# Patient Record
Sex: FEMALE | Race: BLACK OR AFRICAN AMERICAN
[De-identification: names, ages, dates, MRNs, and addresses within clinical notes are randomized per-mention and may not be internally consistent; named-entity substitution may affect disease eponyms.]

---

## 2017-02-24 ENCOUNTER — RX RENEWAL (OUTPATIENT)
Age: 82
End: 2017-02-24

## 2018-07-09 ENCOUNTER — APPOINTMENT (OUTPATIENT)
Dept: OTOLARYNGOLOGY | Facility: CLINIC | Age: 83
End: 2018-07-09
Payer: MEDICARE

## 2018-07-09 VITALS
DIASTOLIC BLOOD PRESSURE: 68 MMHG | BODY MASS INDEX: 34.36 KG/M2 | HEIGHT: 60 IN | SYSTOLIC BLOOD PRESSURE: 130 MMHG | WEIGHT: 175 LBS | HEART RATE: 57 BPM

## 2018-07-09 DIAGNOSIS — H93.13 TINNITUS, BILATERAL: ICD-10-CM

## 2018-07-09 PROCEDURE — 92557 COMPREHENSIVE HEARING TEST: CPT

## 2018-07-09 PROCEDURE — 99203 OFFICE O/P NEW LOW 30 MIN: CPT | Mod: 25

## 2018-07-09 PROCEDURE — 92588 EVOKED AUDITORY TST COMPLETE: CPT

## 2018-07-09 PROCEDURE — 92567 TYMPANOMETRY: CPT

## 2019-11-22 ENCOUNTER — APPOINTMENT (OUTPATIENT)
Dept: OTOLARYNGOLOGY | Facility: CLINIC | Age: 84
End: 2019-11-22
Payer: MEDICARE

## 2019-11-22 VITALS
HEIGHT: 60 IN | SYSTOLIC BLOOD PRESSURE: 149 MMHG | HEART RATE: 67 BPM | BODY MASS INDEX: 33.96 KG/M2 | WEIGHT: 173 LBS | DIASTOLIC BLOOD PRESSURE: 74 MMHG

## 2019-11-22 DIAGNOSIS — H93.11 TINNITUS, RIGHT EAR: ICD-10-CM

## 2019-11-22 PROCEDURE — 92567 TYMPANOMETRY: CPT

## 2019-11-22 PROCEDURE — 99214 OFFICE O/P EST MOD 30 MIN: CPT | Mod: 25

## 2019-11-22 PROCEDURE — 92557 COMPREHENSIVE HEARING TEST: CPT

## 2019-11-22 RX ORDER — MOMETASONE FUROATE 1 MG/G
0.1 OINTMENT TOPICAL
Qty: 1 | Refills: 2 | Status: ACTIVE | COMMUNITY
Start: 2019-11-22 | End: 1900-01-01

## 2019-11-22 NOTE — ASSESSMENT
[FreeTextEntry1] : ear eczema au\par rec mometasone ointment prn\par audio moderate au sn hearing loss rec hearing aid evaluation\par fu prn

## 2019-11-22 NOTE — CONSULT LETTER
[Sincerely,] : Sincerely, [FreeTextEntry1] : Dear Dr. DANNI MURRELL,\par \par Thank you for your kind referral. Please refer to my enclosed office notes for MARLENE ALLEN . If there are any questions free to contact me.\par  [FreeTextEntry3] : Derian Sesay MD, FACS\par \par

## 2020-09-16 ENCOUNTER — NON-APPOINTMENT (OUTPATIENT)
Age: 85
End: 2020-09-16

## 2020-09-16 ENCOUNTER — APPOINTMENT (OUTPATIENT)
Dept: CARDIOLOGY | Facility: CLINIC | Age: 85
End: 2020-09-16
Payer: MEDICARE

## 2020-09-16 VITALS
BODY MASS INDEX: 34.16 KG/M2 | WEIGHT: 174 LBS | HEIGHT: 60 IN | HEART RATE: 59 BPM | DIASTOLIC BLOOD PRESSURE: 71 MMHG | SYSTOLIC BLOOD PRESSURE: 137 MMHG | OXYGEN SATURATION: 97 % | TEMPERATURE: 99.1 F | RESPIRATION RATE: 14 BRPM

## 2020-09-16 DIAGNOSIS — M10.9 GOUT, UNSPECIFIED: ICD-10-CM

## 2020-09-16 DIAGNOSIS — M54.5 LOW BACK PAIN: ICD-10-CM

## 2020-09-16 PROCEDURE — 93000 ELECTROCARDIOGRAM COMPLETE: CPT

## 2020-09-16 PROCEDURE — 99204 OFFICE O/P NEW MOD 45 MIN: CPT

## 2020-09-16 NOTE — DISCUSSION/SUMMARY
[___ Month(s)] : [unfilled] month(s) [With Me] : with me [FreeTextEntry1] : \par Hypertension: Blood pressure is satisfactorily controlled - continue current antihypertensive regimen, including lisinopril, amlodipine, and Bystolic.\par \par Abnormal ECG: Mild, nonspecific T wave abnormality.  Mrs. Call has not been experiencing angina and describes a normal cardiac workup by another cardiologist approximately two-year his earlier - she is not interested in pursuing additional testing at this time.

## 2020-09-16 NOTE — PHYSICAL EXAM
[Eyelids - No Xanthelasma] : the eyelids demonstrated no xanthelasmas [FreeTextEntry1] : No JVD [Heart Rate And Rhythm] : heart rate and rhythm were normal [Heart Sounds] : normal S1 and S2 [Respiration, Rhythm And Depth] : normal respiratory rhythm and effort [Edema] : no peripheral edema present [Murmurs] : no murmurs present [Auscultation Breath Sounds / Voice Sounds] : lungs were clear to auscultation bilaterally [Bowel Sounds] : normal bowel sounds [Abdomen Soft] : soft [Abnormal Walk] : normal gait [Nail Clubbing] : no clubbing of the fingernails [Cyanosis, Localized] : no localized cyanosis [Skin Color & Pigmentation] : normal skin color and pigmentation [Oriented To Time, Place, And Person] : oriented to person, place, and time [Impaired Insight] : insight and judgment were intact

## 2020-09-16 NOTE — REASON FOR VISIT
[Initial Evaluation] : an initial evaluation of [Medication Management] : Medication management [Hypertension] : hypertension

## 2020-09-16 NOTE — HISTORY OF PRESENT ILLNESS
[FreeTextEntry1] : Urmila Call is an 87-year-old woman with a history of hypertension, prediabetes, hypothyroidism, and esophageal reflux who presents for cardiology examination after a long absence - our last encounter was in March 2016; she subsequently found a physician closer to her home because of difficulties commuting to my office.  She requests "a checkup" because a friend recently passed away from an apparent myocardial infarction in his early 50s.  She describes the absence of chest discomfort; occasionally experiences mild dyspnea that she attributes to deconditioning and obesity.  She met with her cardiologist, Dr. Carballo, in 2018 and describes a "normal" workup including stress test.  She has no past history of heart disease.  She describes well controlled chronic illnesses.

## 2020-09-16 NOTE — REVIEW OF SYSTEMS
[Loss Of Hearing] : hearing loss [Eyeglasses] : currently wearing eyeglasses [Shortness Of Breath] : shortness of breath [see HPI] : see HPI [Chest  Pressure] : no chest pressure [Chest Pain] : no chest pain [Joint Pain] : joint pain [Lower Back Pain] : lower back pain [Negative] : Endocrine

## 2021-03-17 ENCOUNTER — APPOINTMENT (OUTPATIENT)
Dept: CARDIOLOGY | Facility: CLINIC | Age: 86
End: 2021-03-17

## 2022-11-03 ENCOUNTER — APPOINTMENT (OUTPATIENT)
Dept: OTOLARYNGOLOGY | Facility: CLINIC | Age: 87
End: 2022-11-03

## 2022-11-03 VITALS — WEIGHT: 170 LBS | HEIGHT: 60 IN | TEMPERATURE: 97.2 F | BODY MASS INDEX: 33.38 KG/M2

## 2022-11-03 DIAGNOSIS — H68.023 CHRONIC EUSTACHIAN SALPINGITIS, BILATERAL: ICD-10-CM

## 2022-11-03 DIAGNOSIS — H60.543 ACUTE ECZEMATOID OTITIS EXTERNA, BILATERAL: ICD-10-CM

## 2022-11-03 PROCEDURE — 99213 OFFICE O/P EST LOW 20 MIN: CPT | Mod: 25

## 2022-11-03 PROCEDURE — 92557 COMPREHENSIVE HEARING TEST: CPT

## 2022-11-03 PROCEDURE — 92567 TYMPANOMETRY: CPT

## 2022-11-03 PROCEDURE — G0268 REMOVAL OF IMPACTED WAX MD: CPT

## 2022-11-03 NOTE — PROCEDURE
[Cerumen Impaction] : Cerumen Impaction [FreeTextEntry6] : Large amount cerumen cleared left and right ear instrumentation with curettes, forceps and suction.\par Ear canals and tympanic membranes  unremarkable.\par dry flaking skin au

## 2022-11-03 NOTE — REASON FOR VISIT
[Subsequent Evaluation] : a subsequent evaluation for [Hearing Loss] : hearing loss [FreeTextEntry2] : ears

## 2022-11-03 NOTE — ASSESSMENT
[FreeTextEntry1] : cerumen cleared au\par chronic ear eczema\par audio moderate a sn loss\par  a/a tymps\par rec hearing aid eval and fitting\par mometasone ointment prn\par fu 1 y

## 2022-11-11 ENCOUNTER — NON-APPOINTMENT (OUTPATIENT)
Age: 87
End: 2022-11-11

## 2022-11-11 ENCOUNTER — APPOINTMENT (OUTPATIENT)
Dept: CARDIOLOGY | Facility: CLINIC | Age: 87
End: 2022-11-11

## 2022-11-11 VITALS
BODY MASS INDEX: 33.38 KG/M2 | HEART RATE: 64 BPM | SYSTOLIC BLOOD PRESSURE: 132 MMHG | HEIGHT: 60 IN | WEIGHT: 170 LBS | DIASTOLIC BLOOD PRESSURE: 60 MMHG | OXYGEN SATURATION: 98 %

## 2022-11-11 PROCEDURE — 99214 OFFICE O/P EST MOD 30 MIN: CPT

## 2022-11-11 PROCEDURE — 93000 ELECTROCARDIOGRAM COMPLETE: CPT

## 2022-11-11 NOTE — REVIEW OF SYSTEMS
[Weight Loss (___ Lbs)] : [unfilled] ~Ulb weight loss [SOB] : no shortness of breath [Dyspnea on exertion] : not dyspnea during exertion [Chest Discomfort] : no chest discomfort [Palpitations] : no palpitations [Syncope] : no syncope [FreeTextEntry9] : Back pain (chronic)

## 2022-11-11 NOTE — PHYSICAL EXAM
[No Acute Distress] : no acute distress [Obese] : obese [Normal S1, S2] : normal S1, S2 [No Murmur] : no murmur [Clear Lung Fields] : clear lung fields [Normal Gait] : normal gait [No Edema] : no edema [Alert and Oriented] : alert and oriented [de-identified] :  extraocular muscles intact [de-identified] :  wearing a facemask [de-identified] :  no JVD

## 2022-11-11 NOTE — REASON FOR VISIT
[CV Risk Factors and Non-Cardiac Disease] : CV risk factors and non-cardiac disease [Hypertension] : hypertension [Other: _____] : [unfilled]

## 2022-11-11 NOTE — HISTORY OF PRESENT ILLNESS
[FreeTextEntry1] : Urmila Call is a 89-year-old woman with a history of hypertension, prediabetes, hypothyroidism, and esophageal reflux who returns for cardiac examination–our last encounter was greater than 2 years ago (September 2020).  She has been well -- recently celebrated 89th birthday in [facility].  She has no cardiac symptoms but describes back pain related to a "herniated disc."She has not been experiencing difficulty breathing, palpitations, chest discomfort, or syncope.  She offers no cardiac complaints during today's encounter.  She describes routine medical care with Dr. Howard.

## 2022-11-11 NOTE — DISCUSSION/SUMMARY
[With Me] : with me [___ Year(s)] : in [unfilled] year(s) [FreeTextEntry1] : \par Hypertension:Controlled; continue lisinopril and amlodipine.\par \par Abnormal ECG:  previously seen, mild, nonspecific T wave abnormalities in the anterolateral leads are no longer present.  She has no exertional symptoms, normal resting ECG, and describes a normal stress test within the past 5 years;  repeat imaging if symptoms were to occur.\par

## 2023-05-12 ENCOUNTER — NON-APPOINTMENT (OUTPATIENT)
Age: 88
End: 2023-05-12

## 2023-05-12 ENCOUNTER — APPOINTMENT (OUTPATIENT)
Dept: CARDIOLOGY | Facility: CLINIC | Age: 88
End: 2023-05-12
Payer: MEDICARE

## 2023-05-12 VITALS
OXYGEN SATURATION: 98 % | DIASTOLIC BLOOD PRESSURE: 60 MMHG | WEIGHT: 172 LBS | SYSTOLIC BLOOD PRESSURE: 130 MMHG | HEART RATE: 72 BPM | BODY MASS INDEX: 33.59 KG/M2

## 2023-05-12 DIAGNOSIS — Z86.79 PERSONAL HISTORY OF OTHER DISEASES OF THE CIRCULATORY SYSTEM: ICD-10-CM

## 2023-05-12 DIAGNOSIS — E66.9 OBESITY, UNSPECIFIED: ICD-10-CM

## 2023-05-12 PROCEDURE — 99214 OFFICE O/P EST MOD 30 MIN: CPT

## 2023-05-12 PROCEDURE — 93000 ELECTROCARDIOGRAM COMPLETE: CPT

## 2023-05-12 NOTE — REVIEW OF SYSTEMS
[SOB] : no shortness of breath [Dyspnea on exertion] : not dyspnea during exertion [Chest Discomfort] : no chest discomfort [Palpitations] : no palpitations [Syncope] : no syncope [FreeTextEntry9] : Back pain, L knee pain

## 2023-05-12 NOTE — DISCUSSION/SUMMARY
[With Me] : with me [___ Year(s)] : in [unfilled] year(s) [FreeTextEntry1] : \par Hypertension:Controlled; continue lisinopril , Bystolic, and amlodipine.\par \par Obesity: I suggested modest weight loss.\par \par **   Patient tells me that her cholesterol is monitored by her primary care physician and has been satisfactorily controlled.\par

## 2023-05-12 NOTE — HISTORY OF PRESENT ILLNESS
[FreeTextEntry1] : Urmila Call is a 89-year-old woman with a history of hypertension, prediabetes, hypothyroidism, and esophageal reflux who returns for cardiac examination.\par \par She has no new cardiovascular complaints but describes persistent problems with her lower back and left knee–has some trouble ambulating but overall has a good baseline functional status.  She has not been experiencing chest discomfort, difficulty breathing, palpitations, or syncope.  She has been tolerating prescribed pharmacotherapy.

## 2023-05-12 NOTE — PHYSICAL EXAM
[No Acute Distress] : no acute distress [Obese] : obese [Normal S1, S2] : normal S1, S2 [No Murmur] : no murmur [Clear Lung Fields] : clear lung fields [No Edema] : no edema [Alert and Oriented] : alert and oriented [No Respiratory Distress] : no respiratory distress  [Abnormal Gait] : abnormal gait [Normal Speech] : normal speech [de-identified] :   Appears younger than her stated age [de-identified] :  no JVD [de-identified] : Warm, dry

## 2023-11-06 ENCOUNTER — APPOINTMENT (OUTPATIENT)
Dept: OTOLARYNGOLOGY | Facility: CLINIC | Age: 88
End: 2023-11-06

## 2023-11-21 ENCOUNTER — APPOINTMENT (OUTPATIENT)
Dept: OTOLARYNGOLOGY | Facility: CLINIC | Age: 88
End: 2023-11-21

## 2023-12-07 ENCOUNTER — APPOINTMENT (OUTPATIENT)
Dept: OTOLARYNGOLOGY | Facility: CLINIC | Age: 88
End: 2023-12-07
Payer: MEDICARE

## 2023-12-07 VITALS — BODY MASS INDEX: 33.77 KG/M2 | WEIGHT: 172 LBS | HEIGHT: 60 IN

## 2023-12-07 DIAGNOSIS — H60.543 ACUTE ECZEMATOID OTITIS EXTERNA, BILATERAL: ICD-10-CM

## 2023-12-07 DIAGNOSIS — H61.23 IMPACTED CERUMEN, BILATERAL: ICD-10-CM

## 2023-12-07 DIAGNOSIS — H90.3 SENSORINEURAL HEARING LOSS, BILATERAL: ICD-10-CM

## 2023-12-07 PROCEDURE — 69210 REMOVE IMPACTED EAR WAX UNI: CPT

## 2023-12-07 PROCEDURE — 99213 OFFICE O/P EST LOW 20 MIN: CPT | Mod: 25

## 2024-05-08 ENCOUNTER — APPOINTMENT (OUTPATIENT)
Dept: CARDIOLOGY | Facility: CLINIC | Age: 89
End: 2024-05-08

## 2024-05-17 ENCOUNTER — NON-APPOINTMENT (OUTPATIENT)
Age: 89
End: 2024-05-17

## 2024-05-17 ENCOUNTER — APPOINTMENT (OUTPATIENT)
Dept: CARDIOLOGY | Facility: CLINIC | Age: 89
End: 2024-05-17
Payer: MEDICARE

## 2024-05-17 VITALS
RESPIRATION RATE: 15 BRPM | BODY MASS INDEX: 33.57 KG/M2 | DIASTOLIC BLOOD PRESSURE: 70 MMHG | HEART RATE: 80 BPM | WEIGHT: 171 LBS | OXYGEN SATURATION: 99 % | SYSTOLIC BLOOD PRESSURE: 120 MMHG | HEIGHT: 60 IN

## 2024-05-17 VITALS
OXYGEN SATURATION: 99 % | DIASTOLIC BLOOD PRESSURE: 70 MMHG | SYSTOLIC BLOOD PRESSURE: 120 MMHG | WEIGHT: 171 LBS | BODY MASS INDEX: 33.4 KG/M2 | HEART RATE: 80 BPM

## 2024-05-17 DIAGNOSIS — I10 ESSENTIAL (PRIMARY) HYPERTENSION: ICD-10-CM

## 2024-05-17 DIAGNOSIS — R94.31 ABNORMAL ELECTROCARDIOGRAM [ECG] [EKG]: ICD-10-CM

## 2024-05-17 DIAGNOSIS — R06.09 OTHER FORMS OF DYSPNEA: ICD-10-CM

## 2024-05-17 PROCEDURE — 99214 OFFICE O/P EST MOD 30 MIN: CPT

## 2024-05-17 PROCEDURE — 93000 ELECTROCARDIOGRAM COMPLETE: CPT

## 2024-05-17 PROCEDURE — G2211 COMPLEX E/M VISIT ADD ON: CPT

## 2024-05-17 NOTE — HISTORY OF PRESENT ILLNESS
[FreeTextEntry1] : Urmila Call is a 89-year-old woman with a history of hypertension, prediabetes, hypothyroidism, and esophageal reflux who returns for cardiac examination -I last saw her approximately 1 year ago.  She describes the recent onset of exertional dyspnea--Unable to walk as far or as fast as she has in the past because of shortness of breath that makes her stop; no associated chest pain or pressure.  Dyspnea improves rather quickly with a brief rest.

## 2024-05-17 NOTE — DISCUSSION/SUMMARY
[FreeTextEntry1] :  Dyspnea on exertion: Recent onset of exertional dyspnea associated with an abnormal ECG--anterolateral T wave inversions (new since last tracing approximately 1 year ago). I recommend an ischemia evaluation and ordered a pharmacologic nuclear stress test--patient's gait does not allow for treadmill exercise.  I recommended that she continue aspirin and will contact her by phone to discuss results of nuclear stress test.  Hypertension: Controlled; continue lisinopril , Bystolic.

## 2024-05-17 NOTE — REASON FOR VISIT
[Hypertension] : hypertension [Symptom and Test Evaluation] : symptom and test evaluation [Family Member] : family member

## 2024-05-17 NOTE — CARDIOLOGY SUMMARY
[de-identified] : 5/17/24. Sinus Bradycardia.   Left atrial enlargement.  Negative T-waves - Anterolateral ischemia.

## 2024-05-17 NOTE — PHYSICAL EXAM
[No Acute Distress] : no acute distress [Obese] : obese [Normal S1, S2] : normal S1, S2 [Clear Lung Fields] : clear lung fields [Abnormal Gait] : abnormal gait [No Edema] : no edema [Alert and Oriented] : alert and oriented [de-identified] : Walks w/ cane

## 2024-05-17 NOTE — REVIEW OF SYSTEMS
[SOB] : shortness of breath [Dyspnea on exertion] : dyspnea during exertion [Under Stress] : under stress [Chest Discomfort] : no chest discomfort [FreeTextEntry2] : Stable weight

## 2024-05-30 ENCOUNTER — OUTPATIENT (OUTPATIENT)
Dept: OUTPATIENT SERVICES | Facility: HOSPITAL | Age: 89
LOS: 1 days | End: 2024-05-30
Payer: MEDICARE

## 2024-05-30 ENCOUNTER — RESULT REVIEW (OUTPATIENT)
Age: 89
End: 2024-05-30

## 2024-05-30 DIAGNOSIS — R94.31 ABNORMAL ELECTROCARDIOGRAM [ECG] [EKG]: ICD-10-CM

## 2024-05-30 PROCEDURE — 93306 TTE W/DOPPLER COMPLETE: CPT

## 2024-05-30 PROCEDURE — 93306 TTE W/DOPPLER COMPLETE: CPT | Mod: 26

## 2024-05-31 DIAGNOSIS — R94.31 ABNORMAL ELECTROCARDIOGRAM [ECG] [EKG]: ICD-10-CM

## 2024-06-07 ENCOUNTER — RESULT REVIEW (OUTPATIENT)
Age: 89
End: 2024-06-07

## 2024-06-07 ENCOUNTER — OUTPATIENT (OUTPATIENT)
Dept: OUTPATIENT SERVICES | Facility: HOSPITAL | Age: 89
LOS: 1 days | End: 2024-06-07
Payer: MEDICARE

## 2024-06-07 DIAGNOSIS — R06.09 OTHER FORMS OF DYSPNEA: ICD-10-CM

## 2024-06-07 PROCEDURE — 78452 HT MUSCLE IMAGE SPECT MULT: CPT | Mod: 26,MC

## 2024-06-07 PROCEDURE — 78452 HT MUSCLE IMAGE SPECT MULT: CPT

## 2024-06-07 PROCEDURE — 93017 CV STRESS TEST TRACING ONLY: CPT

## 2024-06-07 PROCEDURE — 93018 CV STRESS TEST I&R ONLY: CPT

## 2024-06-07 PROCEDURE — 93016 CV STRESS TEST SUPVJ ONLY: CPT

## 2024-06-07 PROCEDURE — A9500: CPT

## 2024-06-07 RX ORDER — LEVOTHYROXINE SODIUM 125 MCG
1 TABLET ORAL
Refills: 0 | DISCHARGE

## 2024-06-07 RX ORDER — LISINOPRIL 2.5 MG/1
1 TABLET ORAL
Refills: 0 | DISCHARGE

## 2024-06-07 RX ORDER — NEBIVOLOL HYDROCHLORIDE 5 MG/1
1 TABLET ORAL
Refills: 0 | DISCHARGE

## 2024-06-07 RX ORDER — REGADENOSON 0.08 MG/ML
0.4 INJECTION, SOLUTION INTRAVENOUS ONCE
Refills: 0 | Status: COMPLETED | OUTPATIENT
Start: 2024-06-07 | End: 2024-06-07

## 2024-06-07 RX ADMIN — REGADENOSON 0.4 MILLIGRAM(S): 0.08 INJECTION, SOLUTION INTRAVENOUS at 09:40

## 2024-06-08 DIAGNOSIS — R06.09 OTHER FORMS OF DYSPNEA: ICD-10-CM

## 2024-10-24 ENCOUNTER — NON-APPOINTMENT (OUTPATIENT)
Age: 89
End: 2024-10-24

## 2024-10-24 ENCOUNTER — APPOINTMENT (OUTPATIENT)
Dept: OTOLARYNGOLOGY | Facility: CLINIC | Age: 89
End: 2024-10-24
Payer: MEDICARE

## 2024-10-24 DIAGNOSIS — H60.543 ACUTE ECZEMATOID OTITIS EXTERNA, BILATERAL: ICD-10-CM

## 2024-10-24 DIAGNOSIS — H90.3 SENSORINEURAL HEARING LOSS, BILATERAL: ICD-10-CM

## 2024-10-24 DIAGNOSIS — H61.23 IMPACTED CERUMEN, BILATERAL: ICD-10-CM

## 2024-10-24 PROCEDURE — 99213 OFFICE O/P EST LOW 20 MIN: CPT | Mod: 25

## 2024-10-24 PROCEDURE — 69210 REMOVE IMPACTED EAR WAX UNI: CPT

## 2024-10-24 RX ORDER — MOMETASONE FUROATE 1 MG/G
0.1 OINTMENT TOPICAL
Qty: 1 | Refills: 2 | Status: ACTIVE | COMMUNITY
Start: 2024-10-24 | End: 1900-01-01

## 2024-12-31 ENCOUNTER — NON-APPOINTMENT (OUTPATIENT)
Age: 88
End: 2024-12-31

## 2024-12-31 ENCOUNTER — APPOINTMENT (OUTPATIENT)
Dept: CARDIOLOGY | Facility: CLINIC | Age: 88
End: 2024-12-31
Payer: MEDICARE

## 2024-12-31 VITALS
HEART RATE: 66 BPM | WEIGHT: 167 LBS | DIASTOLIC BLOOD PRESSURE: 60 MMHG | OXYGEN SATURATION: 96 % | BODY MASS INDEX: 32.79 KG/M2 | HEIGHT: 60 IN | SYSTOLIC BLOOD PRESSURE: 126 MMHG

## 2024-12-31 VITALS — DIASTOLIC BLOOD PRESSURE: 64 MMHG | SYSTOLIC BLOOD PRESSURE: 160 MMHG

## 2024-12-31 DIAGNOSIS — I10 ESSENTIAL (PRIMARY) HYPERTENSION: ICD-10-CM

## 2024-12-31 DIAGNOSIS — R42 DIZZINESS AND GIDDINESS: ICD-10-CM

## 2024-12-31 PROCEDURE — 93000 ELECTROCARDIOGRAM COMPLETE: CPT

## 2024-12-31 PROCEDURE — G2211 COMPLEX E/M VISIT ADD ON: CPT

## 2024-12-31 PROCEDURE — 99214 OFFICE O/P EST MOD 30 MIN: CPT

## 2025-07-16 ENCOUNTER — APPOINTMENT (OUTPATIENT)
Dept: CARDIOLOGY | Facility: CLINIC | Age: 89
End: 2025-07-16
Payer: MEDICARE

## 2025-07-16 VITALS
HEIGHT: 60 IN | WEIGHT: 167 LBS | OXYGEN SATURATION: 93 % | BODY MASS INDEX: 32.79 KG/M2 | SYSTOLIC BLOOD PRESSURE: 130 MMHG | DIASTOLIC BLOOD PRESSURE: 64 MMHG | HEART RATE: 61 BPM

## 2025-07-16 PROCEDURE — G2211 COMPLEX E/M VISIT ADD ON: CPT

## 2025-07-16 PROCEDURE — 93000 ELECTROCARDIOGRAM COMPLETE: CPT

## 2025-07-16 PROCEDURE — 99214 OFFICE O/P EST MOD 30 MIN: CPT
